# Patient Record
Sex: FEMALE | Race: WHITE | Employment: UNEMPLOYED | URBAN - METROPOLITAN AREA
[De-identification: names, ages, dates, MRNs, and addresses within clinical notes are randomized per-mention and may not be internally consistent; named-entity substitution may affect disease eponyms.]

---

## 2024-08-22 ENCOUNTER — OFFICE VISIT (OUTPATIENT)
Dept: ENDOCRINOLOGY | Facility: CLINIC | Age: 39
End: 2024-08-22
Payer: COMMERCIAL

## 2024-08-22 ENCOUNTER — APPOINTMENT (OUTPATIENT)
Dept: LAB | Facility: CLINIC | Age: 39
End: 2024-08-22
Payer: COMMERCIAL

## 2024-08-22 VITALS
WEIGHT: 211 LBS | DIASTOLIC BLOOD PRESSURE: 82 MMHG | BODY MASS INDEX: 37.39 KG/M2 | HEIGHT: 63 IN | SYSTOLIC BLOOD PRESSURE: 122 MMHG

## 2024-08-22 DIAGNOSIS — Z31.69 ENCOUNTER FOR PRECONCEPTION CONSULTATION: ICD-10-CM

## 2024-08-22 DIAGNOSIS — R79.89 ELEVATED TSH: ICD-10-CM

## 2024-08-22 DIAGNOSIS — R79.89 ELEVATED TSH: Primary | ICD-10-CM

## 2024-08-22 LAB
T4 FREE SERPL-MCNC: 0.68 NG/DL (ref 0.61–1.12)
TSH SERPL DL<=0.05 MIU/L-ACNC: 3.59 UIU/ML (ref 0.45–4.5)

## 2024-08-22 PROCEDURE — 86800 THYROGLOBULIN ANTIBODY: CPT

## 2024-08-22 PROCEDURE — 36415 COLL VENOUS BLD VENIPUNCTURE: CPT

## 2024-08-22 PROCEDURE — 86376 MICROSOMAL ANTIBODY EACH: CPT

## 2024-08-22 PROCEDURE — 84439 ASSAY OF FREE THYROXINE: CPT

## 2024-08-22 PROCEDURE — 99244 OFF/OP CNSLTJ NEW/EST MOD 40: CPT | Performed by: INTERNAL MEDICINE

## 2024-08-22 PROCEDURE — 84443 ASSAY THYROID STIM HORMONE: CPT

## 2024-08-22 NOTE — PROGRESS NOTES
Marissa Hartman  39 y.o. Female MRN: [id number]   Encounter: [id number]    New Patient Consult Note    CC: Elevated TSH     Referring Provider:  Emmanuel Flores MD  [address] [facility], [address]  [place] [address]      ASSESSMENT/PLAN  Assessment:  Marissa Hartman is a 39 y.o. female with an elevated TSH level who is seeking fertility via IVF.     Plan:        Elevated TSH   - No free T4 level or additional labs have been completed, but this does raise concern for underlying hypothyroidism  - Will repeat a TSH and obtain a free T4 and thyroid microsomal and thyroglobulin antibodies   - Based on the results of the TFTs, will plan to initiate levothyroxine at either 50 mcg (if TSH between 2.5-4.0) or 88 mcg (if TSH >4.0) and repeat labs in 6 weeks  - Will aim a goal TSH of 2.0 to 2.5 prior to conception  - Counseled patient that levothyroxine should be taken on an empty stomach first thing in the morning with water only. Patient should wait at least 30 minutes after taking levothyroxine to eat and 1 hour to take regular medications. Patient should wait at least 4 hours to take medications like iron, calcium or calcium-containing medications like Tums, H-2 blockers, and PPI.  - Above was discussed with her in detail and she was recommended to schedule an appointment with a local endocrinologist to monitor her TFTs during her  period    2.   Preconception evaluation  - Will recheck TSH and free T4 and obtain antibody testing to exclude underlying Hashimoto's thyroiditis  - Goal TSH is ideally 2.0 to 2.5 prior to conception      HISTORY OF PRESENT ILLNESS  HPI:   Marissa Hartman is a 39 y.o. female with a past medical history significant for asthma, migraines, and obesity who presents for initial evaluation of her elevated TSH level. She is referred by Dr. Emmanuel Flores from [facility]. She is accompanied to this visit by her  who provides additional history.     Her TSH was 5.28  on 8/21/24. She is currently undergoing IVF with [facility] due to her age and they are requesting her TSH be less than 2.5 prior to treatment. She has undergone 5 IVF harvests so far of which 2 are successful.     She endorses headaches, fatigue, weight gain (50 lbs over 2 years), cold and heat intolerance, constipation with bowel movements once a week, occasional tremors, occasional palpitations, difficulty falling asleep, anxiety, and previous history of hair loss.     She denies any neck swelling or neck compressive/obstructive symptoms. She has 2 previous pregnancies during which she did not have thyroid disease. She additionally has two miscarriages.     FDLMP was on 8/13/24. She has a remote history of amenorrhea for 9 months at age of 16. She is taking prenatal vitamins. She denies use of biotin. She believes she may be sick with food poisoning for the past few days. She endorses remote history of lithium use at the age of 16 for 1.5 years. She denies recent exposure to contrast or steroids. She additionally denies previous history of radiation exposure to the head, neck, or chest.     Family history: Sister with thyroid disease. No known autoimmune conditions.  Tobacco: Former smoker quit 14 years ago  Alcohol: Denies  Drugs: Denies  Occupation: Caregiver      Review of Systems   Constitutional:  Positive for fatigue and unexpected weight change (+50 lbs over past 2 years). Negative for chills and fever.   HENT:  Negative for ear pain, sore throat, trouble swallowing and voice change.    Eyes:  Negative for pain and visual disturbance.   Respiratory:  Negative for cough and shortness of breath.    Cardiovascular:  Positive for palpitations. Negative for chest pain.   Gastrointestinal:  Positive for constipation. Negative for abdominal pain, diarrhea and vomiting.   Endocrine: Positive for cold intolerance and heat intolerance.   Genitourinary:  Negative for dysuria and hematuria.  "  Musculoskeletal:  Negative for arthralgias and back pain.   Skin:  Negative for color change and rash.        Hair loss   Neurological:  Positive for tremors (occasionally). Negative for seizures and syncope.   Psychiatric/Behavioral:  Positive for sleep disturbance. Negative for dysphoric mood. The patient is nervous/anxious.    All other systems reviewed and are negative.       Past Medical History:   Diagnosis Date    Asthma     History of gallstones     Migraines     Obesity (BMI 30-39.9)         Past Surgical History:   Procedure Laterality Date    CHOLECYSTECTOMY          Family History   Problem Relation Age of Onset    Sarcoidosis Mother         Social History     Substance and Sexual Activity   Alcohol Use Not on file     Social History     Tobacco Use   Smoking Status Not on file   Smokeless Tobacco Not on file         OBJECTIVE  Visit Vitals  /82 (BP Location: Left arm, Patient Position: Sitting, Cuff Size: Large)   Ht 5' 3\" (1.6 m)   Wt 95.7 kg (211 lb)   BMI 37.38 kg/m²   BSA 1.98 m²       Physical Exam  Constitutional:       Appearance: Normal appearance.   HENT:      Head: Normocephalic and atraumatic. Hair is normal.      Mouth/Throat:      Mouth: Mucous membranes are moist.      Pharynx: Oropharynx is clear.   Eyes:      Extraocular Movements: Extraocular movements intact.      Pupils: Pupils are equal, round, and reactive to light.   Neck:      Thyroid: No thyromegaly or thyroid tenderness.      Trachea: No tracheal tenderness or tracheal deviation.   Cardiovascular:      Rate and Rhythm: Normal rate and regular rhythm.      Pulses: Normal pulses.      Heart sounds: S1 normal and S2 normal.   Pulmonary:      Effort: No respiratory distress.      Breath sounds: Normal breath sounds.   Abdominal:      General: Abdomen is flat. Bowel sounds are normal.      Palpations: Abdomen is soft.      Tenderness: There is no abdominal tenderness.   Musculoskeletal:      Cervical back: Full passive range " "of motion without pain, normal range of motion and neck supple. No tenderness.   Lymphadenopathy:      Cervical: No cervical adenopathy.   Skin:     General: Skin is warm and dry.   Neurological:      Mental Status: She is alert and oriented to person, place, and time.      Motor: No tremor.      Deep Tendon Reflexes: Reflexes are normal and symmetric.      Reflex Scores:       Bicep reflexes are 2+ on the left side.  Psychiatric:         Mood and Affect: Mood normal.         Thought Content: Thought content normal.         Judgment: Judgment normal.           Labs:  No results found for: \"[id number]\", \"TSH\"    No results found for: \"TSH\", \"H9DKFYD\", \"T6LDCDL\"     No results found for: \"TPOAB\"     No results found for: \"TPOAB\"     No components found for: \"THYGLOB\", \"THYGLOBAB\"      No results found for: \"CREAT\", \"BUN\", \"NA\", \"K\", \"CL\", \"CO2\"     No results found for: \"CALCIUM\", \"PHOS\"     Imaging:  No orders to display        Discussed with the patient and all questioned fully answered. She will call me if any problems arise.    Counseled patient on diagnostic results, prognosis, risk and benefit of treatment options, instruction for management, importance of treatment compliance, risk factor reduction, and impressions.   "

## 2024-08-23 DIAGNOSIS — E06.3 HASHIMOTO'S THYROIDITIS: Primary | ICD-10-CM

## 2024-08-23 LAB — THYROGLOB AB SERPL-ACNC: 1.9 IU/ML (ref 0–0.9)

## 2024-08-23 RX ORDER — LEVOTHYROXINE SODIUM 50 MCG
50 TABLET ORAL DAILY
Qty: 90 TABLET | Refills: 1 | Status: SHIPPED | OUTPATIENT
Start: 2024-08-23

## 2024-08-24 LAB — THYROPEROXIDASE AB SERPL-ACNC: 105 IU/ML (ref 0–34)

## 2024-09-19 ENCOUNTER — TELEPHONE (OUTPATIENT)
Dept: ENDOCRINOLOGY | Facility: CLINIC | Age: 39
End: 2024-09-19

## 2024-09-19 NOTE — TELEPHONE ENCOUNTER
----- Message from Carey Her DO sent at 9/19/2024  2:56 PM EDT -----  Please mail patient a copy of the updated lab orders.

## 2024-10-28 ENCOUNTER — TELEPHONE (OUTPATIENT)
Age: 39
End: 2024-10-28

## 2024-10-28 NOTE — TELEPHONE ENCOUNTER
Patient called stating that she moved to [place] and needs you to fax a referral for her to get in to a Endocrinologist where she is currently residing.  Dr Ezra Blake III fax number [phone]. Patient would also like you to mail her a copy of the referral.

## 2024-10-29 DIAGNOSIS — Z31.69 ENCOUNTER FOR PRECONCEPTION CONSULTATION: ICD-10-CM

## 2024-10-29 DIAGNOSIS — R79.89 ELEVATED TSH: Primary | ICD-10-CM

## 2025-02-13 DIAGNOSIS — E06.3 HASHIMOTO'S THYROIDITIS: ICD-10-CM

## 2025-02-13 RX ORDER — LEVOTHYROXINE SODIUM 50 MCG
50 TABLET ORAL DAILY
Qty: 90 TABLET | Refills: 1 | Status: SHIPPED | OUTPATIENT
Start: 2025-02-13